# Patient Record
Sex: FEMALE | Race: BLACK OR AFRICAN AMERICAN | Employment: FULL TIME | URBAN - METROPOLITAN AREA
[De-identification: names, ages, dates, MRNs, and addresses within clinical notes are randomized per-mention and may not be internally consistent; named-entity substitution may affect disease eponyms.]

---

## 2017-05-07 ENCOUNTER — APPOINTMENT (OUTPATIENT)
Dept: GENERAL RADIOLOGY | Age: 22
End: 2017-05-07
Attending: EMERGENCY MEDICINE
Payer: MEDICAID

## 2017-05-07 ENCOUNTER — HOSPITAL ENCOUNTER (EMERGENCY)
Age: 22
Discharge: HOME | End: 2017-05-07
Attending: EMERGENCY MEDICINE | Admitting: EMERGENCY MEDICINE
Payer: MEDICAID

## 2017-05-07 VITALS
OXYGEN SATURATION: 98 % | HEART RATE: 79 BPM | WEIGHT: 240 LBS | DIASTOLIC BLOOD PRESSURE: 65 MMHG | BODY MASS INDEX: 39.99 KG/M2 | SYSTOLIC BLOOD PRESSURE: 130 MMHG | TEMPERATURE: 98.3 F | RESPIRATION RATE: 18 BRPM | HEIGHT: 65 IN

## 2017-05-07 DIAGNOSIS — R07.89 MUSCULOSKELETAL CHEST PAIN: Primary | ICD-10-CM

## 2017-05-07 LAB
ATRIAL RATE: 86 BPM
CALCULATED P AXIS, ECG09: 48 DEGREES
CALCULATED R AXIS, ECG10: 68 DEGREES
CALCULATED T AXIS, ECG11: 36 DEGREES
DIAGNOSIS, 93000: NORMAL
P-R INTERVAL, ECG05: 172 MS
Q-T INTERVAL, ECG07: 386 MS
QRS DURATION, ECG06: 86 MS
QTC CALCULATION (BEZET), ECG08: 461 MS
VENTRICULAR RATE, ECG03: 86 BPM

## 2017-05-07 PROCEDURE — 71020 XR CHEST PA LAT: CPT

## 2017-05-07 PROCEDURE — 99283 EMERGENCY DEPT VISIT LOW MDM: CPT

## 2017-05-07 PROCEDURE — 93005 ELECTROCARDIOGRAM TRACING: CPT

## 2017-05-07 RX ORDER — NAPROXEN 375 MG/1
375 TABLET ORAL 2 TIMES DAILY WITH MEALS
Qty: 20 TAB | Refills: 0 | Status: SHIPPED | OUTPATIENT
Start: 2017-05-07

## 2017-05-07 NOTE — LETTER
[facility] [place] 
THE [facility] EMERGENCY DEPT 
[address] [address] 
[phone] Work/School Note Date: 5/7/2017 To Whom It May concern: 
 
Vivek Tafoya was seen and treated today in the emergency room by the following provider(s): 
Attending Provider: Harriett Pardo MD. Vivek Tafoya may return to work on 5/10/2017 Sincerely, 
 
 
 
 
Harriett Pardo MD

## 2017-05-08 NOTE — ED TRIAGE NOTES
Pt reports to ED c/c left side chest pain onset 2 days while lying down.  Pt states, \"it feels like something is stuck in my chest.\"

## 2017-05-08 NOTE — ED NOTES
I have reviewed discharge instructions with the patient. The patient verbalized understanding. Patient armband removed and shredded. Patient given 1 prescriptions. Discussed side effects with patient. patient verbalized understanding. Patient ambulated to lobby with steady gait. Patient discharged in stable condition to care of self.

## 2017-05-08 NOTE — ED NOTES
Patient sitting on stretcher in NAD; patient given warm blanket. Updated on plan of care. Patient given remote.

## 2017-05-08 NOTE — DISCHARGE INSTRUCTIONS
Chest Pain: Care Instructions  Your Care Instructions  There are many things that can cause chest pain. Some are not serious and will get better on their own in a few days. But some kinds of chest pain need more testing and treatment. Your doctor may have recommended a follow-up visit in the next 8 to 12 hours. If you are not getting better, you may need more tests or treatment. Even though your doctor has released you, you still need to watch for any problems. The doctor carefully checked you, but sometimes problems can develop later. If you have new symptoms or if your symptoms do not get better, get medical care right away. If you have worse or different chest pain or pressure that lasts more than 5 minutes or you passed out (lost consciousness), call 911 or seek other emergency help right away. A medical visit is only one step in your treatment. Even if you feel better, you still need to do what your doctor recommends, such as going to all suggested follow-up appointments and taking medicines exactly as directed. This will help you recover and help prevent future problems. How can you care for yourself at home? · Rest until you feel better. · Take your medicine exactly as prescribed. Call your doctor if you think you are having a problem with your medicine. · Do not drive after taking a prescription pain medicine. When should you call for help? Call 911 if:  · You passed out (lost consciousness). · You have severe difficulty breathing. · You have symptoms of a heart attack. These may include:  ¨ Chest pain or pressure, or a strange feeling in your chest.  ¨ Sweating. ¨ Shortness of breath. ¨ Nausea or vomiting. ¨ Pain, pressure, or a strange feeling in your back, neck, jaw, or upper belly or in one or both shoulders or arms. ¨ Lightheadedness or sudden weakness. ¨ A fast or irregular heartbeat.   After you call 911, the  may tell you to chew 1 adult-strength or 2 to 4 low-dose aspirin. Wait for an ambulance. Do not try to drive yourself. Call your doctor today if:  · You have any trouble breathing. · Your chest pain gets worse. · You are dizzy or lightheaded, or you feel like you may faint. · You are not getting better as expected. · You are having new or different chest pain. Where can you learn more? Go to http://genesis-jessica.info/. Enter A120 in the search box to learn more about \"Chest Pain: Care Instructions. \"  Current as of: May 27, 2016  Content Version: 11.2  © 5915-7316 [employer]. Care instructions adapted under license by [employer] (which disclaims liability or warranty for this information). If you have questions about a medical condition or this instruction, always ask your healthcare professional. [address] any warranty or liability for your use of this information. Musculoskeletal Chest Pain: Care Instructions  Your Care Instructions  Chest pain is not always a sign that something is wrong with your heart or that you have another serious problem. The doctor thinks your chest pain is caused by strained muscles or ligaments, inflamed chest cartilage, or another problem in your chest, rather than by your heart. You may need more tests to find the cause of your chest pain. Follow-up care is a key part of your treatment and safety. Be sure to make and go to all appointments, and call your doctor if you are having problems. Its also a good idea to know your test results and keep a list of the medicines you take. How can you care for yourself at home? · Take pain medicines exactly as directed. ¨ If the doctor gave you a prescription medicine for pain, take it as prescribed. ¨ If you are not taking a prescription pain medicine, ask your doctor if you can take an over-the-counter medicine. · Rest and protect the sore area.   · Stop, change, or take a break from any activity that may be causing your pain or soreness. · Put ice or a cold pack on the sore area for 10 to 20 minutes at a time. Try to do this every 1 to 2 hours for the next 3 days (when you are awake) or until the swelling goes down. Put a thin cloth between the ice and your skin. · After 2 or 3 days, apply a heating pad set on low or a warm cloth to the area that hurts. Some doctors suggest that you go back and forth between hot and cold. · Do not wrap or tape your ribs for support. This may cause you to take smaller breaths, which could increase your risk of lung problems. · Mentholated creams such as Bengay or Icy Hot may soothe sore muscles. Follow the instructions on the package. · Follow your doctor's instructions for exercising. · Gentle stretching and massage may help you get better faster. Stretch slowly to the point just before pain begins, and hold the stretch for at least 15 to 30 seconds. Do this 3 or 4 times a day. Stretch just after you have applied heat. · As your pain gets better, slowly return to your normal activities. Any increased pain may be a sign that you need to rest a while longer. When should you call for help? Call 911 anytime you think you may need emergency care. For example, call if:  · You have chest pain or pressure. This may occur with:  ¨ Sweating. ¨ Shortness of breath. ¨ Nausea or vomiting. ¨ Pain that spreads from the chest to the neck, jaw, or one or both shoulders or arms. ¨ Dizziness or lightheadedness. ¨ A fast or uneven pulse. After calling 911, chew 1 adult-strength aspirin. Wait for an ambulance. Do not try to drive yourself. · You have sudden chest pain and shortness of breath, or you cough up blood. Call your doctor now or seek immediate medical care if:  · You have any trouble breathing. · Your chest pain gets worse.   · Your chest pain occurs consistently with exercise and is relieved by rest.  Watch closely for changes in your health, and be sure to contact your doctor if:  · Your chest pain does not get better after 1 week. Where can you learn more? Go to http://genesis-jessica.info/. Enter V293 in the search box to learn more about \"Musculoskeletal Chest Pain: Care Instructions. \"  Current as of: May 27, 2016  Content Version: 11.2  © 0353-2785 [employer]. Care instructions adapted under license by [employer] (which disclaims liability or warranty for this information). If you have questions about a medical condition or this instruction, always ask your healthcare professional. [address] any warranty or liability for your use of this information.

## 2017-05-08 NOTE — ED PROVIDER NOTES
HPI Comments: 9:02 PM   Israel Eldridge is a 24 y.o. female presenting to the ED C/O intermittent left sided chest pain (6/10) onset 2 days ago. Pt reports she has had muscle spasm-like pain in her chest for the past two days that feel like \"shocks\" . Bending down worsens her pain. Pt states that when her chest pain begins she also feels neck pain. Pt also complains of nausea. PMHx of asthma Pt denies use of birth control or recent travel. FMHx cardiac problems (father). Pt denies coughing, SOB,difficulty swallowing, loss of appetite, trouble urinating, constipation, diarrhea and any other Sx or complaints. Patient is a 24 y.o. female presenting with chest pain. The history is provided by the patient. No  was used. Chest Pain (Angina)    This is a new problem. The current episode started 2 days ago. The problem has not changed since onset. The pain is present in the left side. The pain is at a severity of 6/10. Associated symptoms include nausea. Pertinent negatives include no cough and no shortness of breath. Written by ALMA Loweibwanda, as dictated by Missy Hall MD    Past Medical History:   Diagnosis Date    Asthma        History reviewed. No pertinent surgical history. History reviewed. No pertinent family history. Social History     Social History    Marital status: SINGLE     Spouse name: N/A    Number of children: N/A    Years of education: N/A     Occupational History    Not on file. Social History Main Topics    Smoking status: Never Smoker    Smokeless tobacco: Not on file    Alcohol use Yes    Drug use: No    Sexual activity: Not on file     Other Topics Concern    Not on file     Social History Narrative    No narrative on file         ALLERGIES: Review of patient's allergies indicates no known allergies. Review of Systems   Constitutional: Negative for appetite change. HENT: Negative for trouble swallowing.     Respiratory: Negative for cough and shortness of breath. Cardiovascular: Positive for chest pain. Gastrointestinal: Positive for nausea. Negative for constipation and diarrhea. Genitourinary: Negative for difficulty urinating. Musculoskeletal: Positive for neck pain. All other systems reviewed and are negative. Vitals:    05/07/17 2005 05/07/17 2205   BP: 137/82 130/65   Pulse: 77 79   Resp: 16 18   Temp: 98.3 °F (36.8 °C)    SpO2: 100% 98%   Weight: 108.9 kg (240 lb)    Height: 5' 5\" (1.651 m)             Physical Exam   Nursing note and vitals reviewed. -------------------------PHYSICAL EXAM-------------------------    Vital signs and nursing notes reviewed  Nursing note and VS were reviewed      CONSTITUTIONAL: Mental status: Awake and alert. No immediate acute distress. Non-toxic in appearance. Well developed, well nourished and appears adequately hydrated. HEAD: Normocephalic, Atraumatic. EYES: Pupils are equal, round and reactive. Extra-ocular movements intact. Sclera are non icteric. Conjunctiva not injected. ENT: Mucous membranes are moist.   Oral mucosa: There is no erythema or swelling; No oral lesions or thrush. Tonsillar tissue: NO enlargement of the tonsillar tissue or the presence of exudates. Ears: R:  EAC - clear, no swelling with TM that is normal in appearance. L:  EAC - clear, no swelling with TM that is normal in appearance. Nasal mucosa pink with no discharge and the turbinates are of normal size. NECK: Normal ROM. Neck is supple. Anterior aspect: Anterior cervical adenopathy: nothing abnormal.  No obvious enlargement of the thyroid. Trachea is midline. Posterior aspect: Posterior cervical paraspinal muscles are non tender and  bony midline is non tender. Posterior adenopathy: none palpable. CARDIOVASCULAR:  The rhythm is regular and the rate sounds to be normal. No murmurs, rubs or gallops.  Distal pulses are 2+ and equal.    PULMONARY: Respiratory effort is normal and without the use of accessory muscles. Patient is speaking in full sentences. Air exchange is good. Breath sounds:  Clear to auscultation bilaterally. No wheezing, rales or rhonchi. CHEST WALL: Normal shape; Chest wall with mild tenderness extending to left pectoralis muscle    ABDOMINAL: Visual: non -distended; Ausculation: Bowel sounds are present. Palpation: Soft; No obvious organomegaly; Palpable tenderness: NONE. NO peritoneal signs - No rebound, guarding or rigidity. BACK: Midline - No bony tenderness; Paraspinal muscles are non tender. Full range of motion. No CVA tenderness. MUSCULOSKELETAL:   Lower Extremities: Peripheral edema- none noted; In general there is No obvious soft tissue tenderness or sites of bony tenderness or deformities;   Joints: No evidence of acute inflammatory changes and have good range of motion in all extremities. Muscles: Good tone and No obvious muscle tenderness. Upper Extremities: Peripheral edema- none noted; In general there is No obvious soft tissue tenderness or sites of bony tenderness or deformities;   Joints: No evidence of acute inflammatory changes and have good range of motion in all extremities. Muscles: Good tone and No obvious muscle tenderness. SKIN:  Good skin turgor. Cap Refill is Normal. Skin is warm and dry and with NO diaphoresis. Rashes: NONE or nothing that appears infectious; NO petechiae. NO particular lesions. NEUROLOGICAL: Alert, awake and appropriately oriented. Normal speech. CN's are normal;  Motor - no focal weakness; no obvious sensory loss; Cerebellar function- intact; DTR's - 2+ equal    PSYCH: Appropriate affect; normal thought content; no expressed suicidal ideation.       MDM  Number of Diagnoses or Management Options  Musculoskeletal chest pain:   Diagnosis management comments: INITIAL CLINICAL IMPRESSION and PLANS:  The patient presents with the primary complaint(s) of: chest pain. The presentation, to include historical aspects and clinical findings appear to be consistent with the DX of musculoskeletal pain. However, other possible DX's to consider as primary, associated with, or exacerbated by include:    pulmonary process    Considering the above, my initial management plan to evaluate and therapeutic interventions include: Obtain Radiologic studies,      Hans Sinclair MD         Amount and/or Complexity of Data Reviewed  Tests in the radiology section of CPT®: reviewed and ordered (Chest x-ray)  Tests in the medicine section of CPT®: reviewed and ordered (EKG)  Independent visualization of images, tracings, or specimens: yes (Chest x-ray, EKG)      ED Course     Medications - No data to display   Procedures    EKG interpretation: (Preliminary)  Normal Sinus rhythm, 86 bpm, QT//461 ms  EKG read by Nikolas Mills PA-C MD  at 7:34 PM    ED CLINICAL SUMMARY - DISCHARGE     11:18 PM   CLINICAL COURSE while in the ED:      Intervention)s) while in ED:  ACTIONS / APPROACH: Based on the presenting ACUTE history of chest pain My initial focus was to Determine the cause and extent of the problem and Initiate Treatment as Appropriate . Details of actions taken are noted below. SPECIFICS REGARDING APPROACH:     1.  DIAGNOSTIC RESULTS:     X-RAY FINDINGS:  11:09 PM  Chest x-ray shows no acute process  Pending review by Radiologist  Recorded by Patricia Farooq, ED Scribe, as dictated by Jerri Lr MD     XR CHEST PA LAT    (Results Pending)          Labs Reviewed - No data to display        Recent Results (from the past 12 hour(s))   EKG, 12 LEAD, INITIAL    Collection Time: 05/07/17  7:34 PM   Result Value Ref Range    Ventricular Rate 86 BPM    Atrial Rate 86 BPM    P-R Interval 172 ms    QRS Duration 86 ms    Q-T Interval 386 ms    QTC Calculation (Bezet) 461 ms    Calculated P Axis 48 degrees    Calculated R Axis 68 degrees    Calculated T Axis 36 degrees    Diagnosis Normal sinus rhythm  Poor R Wave Progression  Clinical correlation needed  Confirmed by Allen Winkler MD, Sharath ([id number]) on 5/7/2017 9:29:02 PM         2. MEDICATIONS GIVEN: Medications - No data to display     Response to Intervention(s):   IMPROVED       Unanticipated Developments: NONE     ED COURSE - General Comment:  During the ED course I had re-evaluated the patient, answered their and /or their family's questions regarding my clinical impression, the patient's condition and plans for therapeutic interventions. The patient's ED course was uneventful and remained stable throughout. CLINICAL IMPRESSION AND DISCUSSION:   I reviewed our electronic medical record system for any past medical records that were available that may contribute to the patients current condition, the nursing notes and vital signs from today's visit. Based on the clinical presentation, findings and results of diagnostic studies, as well as developments while in the ED,  I suspect the following: For the presentation noted above    My clinical impression is: musculoskeletal chest pain        DISCUSSION REGARDING CLINICAL IMPRESSION: The specifics regarding my clinical impression / diagnosis are as follows: At this time there is no clinical evidence to support other pertinent diagnostic considerations such as:   N/A    SUMMARY DISCUSSION:        Specific Conversations:  NONE      DISPOSITION DECISION:     DISCHARGE: I feel that we have optimized outpatient assessment and management such that Zulma Rhodes is stable to be discharged and to continue with her care or complete any additional evaluation as appropriate at home or as an outpatient. Preparations will be made to discharge the patient. Present condition at the time of disposition: STABLE    ANY SPECIFIC INFORMATION REGARDING THE DISPOSITION: NONE        DISCHARGE NOTE:    Zachary Mejia  results have been reviewed with her.   She has been counseled regarding her diagnosis, treatment, and plan. She verbally conveys understanding and agreement of the signs, symptoms, diagnosis, treatment and prognosis and additionally agrees to follow up as discussed. She also agrees with the care-plan and conveys that all of her questions have been answered. I have also provided discharge instructions for her that include: educational information regarding their diagnosis and treatment, and list of reasons why they would want to return to the ED prior to their follow-up appointment, should her condition change. The patient and/or family has been provided with education for proper Emergency Department utilization. CLINICAL IMPRESSION  1. Musculoskeletal chest pain        PLAN:  1. D/C home  2. Patient's Medications   Start Taking    NAPROXEN (NAPROSYN) 375 MG TABLET    Take 1 Tab by mouth two (2) times daily (with meals). Continue Taking    No medications on file   These Medications have changed    No medications on file   Stop Taking    No medications on file     3. Follow-up Information     Follow up With Details Comments [facility], [place] Schedule an appointment as soon as possible for a visit in 2 days Follow up with your primary care physician [address] [facility]      THE [facility] EMERGENCY DEPT Go to If symptoms worsen, As needed [address] [place] [id number]  [phone]        Return if sxs worsen    ATTESTATIONS:  This note is prepared by Alec Triana, acting as Scribe for Merlene Walker MD.    Merlene Walker MD: The scribe's documentation has been prepared under my direction and personally reviewed by me in its entirety. I confirm that the note above accurately reflects all work, treatment, procedures, and medical decision making performed by me.

## 2022-11-08 ENCOUNTER — OFFICE VISIT (OUTPATIENT)
Dept: SURGERY | Age: 27
End: 2022-11-08
Payer: MEDICAID

## 2022-11-08 ENCOUNTER — HOSPITAL ENCOUNTER (OUTPATIENT)
Dept: BARIATRICS/WEIGHT MGMT | Age: 27
Discharge: HOME | End: 2022-11-08

## 2022-11-08 VITALS — HEIGHT: 66 IN | BODY MASS INDEX: 46.12 KG/M2 | WEIGHT: 287 LBS

## 2022-11-08 VITALS — HEIGHT: 66 IN | BODY MASS INDEX: 46.12 KG/M2 | WEIGHT: 287 LBS | RESPIRATION RATE: 18 BRPM

## 2022-11-08 DIAGNOSIS — F41.9 ANXIETY: Chronic | ICD-10-CM

## 2022-11-08 DIAGNOSIS — E66.01 CLASS 3 SEVERE OBESITY WITH SERIOUS COMORBIDITY IN ADULT, UNSPECIFIED BMI, UNSPECIFIED OBESITY TYPE (HCC): ICD-10-CM

## 2022-11-08 DIAGNOSIS — I10 HYPERTENSION, UNSPECIFIED TYPE: Primary | ICD-10-CM

## 2022-11-08 PROCEDURE — 99205 OFFICE O/P NEW HI 60 MIN: CPT | Performed by: STUDENT IN AN ORGANIZED HEALTH CARE EDUCATION/TRAINING PROGRAM

## 2022-11-08 RX ORDER — AMLODIPINE BESYLATE 5 MG/1
5 TABLET ORAL DAILY
COMMUNITY

## 2022-11-08 NOTE — PROGRESS NOTES
Bariatric Surgery Initial Consult    Patient: Dwight Coley MRN: [id number]  SSN: [id number]    YOB: 1995  Age: 28 y.o. Sex: female      Subjective:      CC: Morbid Obesity    Current Weight: 287  Body mass index is 46.32 kg/m². Ideal body weight: 59.3 kg (130 lb 11.7 oz)  Adjusted ideal body weight: 87.7 kg (193 lb 3.8 oz)  Excess Body Weight: 150    Dwight Coley is a 28 y.o. female who is being seen for new bariatric consultation. She has been thinking about bariatric surgery for several years. She is currently at her highest weight. She has tried weight loss in the past by increasing exercise as well as with a low-carb diet and weight watchers without any lasting sustained success. She has struggled with her weight for a long period of time, though she did gain more weight after her pregnancy in 2020. Obesity related comorbidities include hypertension. She does have only mild asthma which is only typically an issue when she gets sick. Does have a history of anxiety and some depression and was on Zoloft for a time, though she weaned herself off of it because she did not like the way it made her feel and was contributing to her weight gain. She denies any history of heartburn, regurgitation, or other reflux symptoms. She does note that she was told during her last pregnancy that she had a murmur and had seen a cardiologist in [place] regarding this. Those records are not available to us. She is interested in the sleeve gastrectomy. Past Medical History:   Diagnosis Date    Asthma    - Hypertension    PSH: D&C    No Known Allergies  Current Outpatient Medications   Medication Sig Dispense Refill    naproxen (NAPROSYN) 375 mg tablet Take 1 Tab by mouth two (2) times daily (with meals).  20 Tab 0   - amlodipine 5 mg every day    Social History     Tobacco Use    Smoking status: Never    Smokeless tobacco: Not on file   Substance Use Topics    Alcohol use: Yes   - History of THC use    FH: AAA     Last Pap Smear: 2021  Mammogram: N/A  Colonoscopy: N/A  EGD/UGI:None  STOP-BANG score: 2     Review of Systems:    General: Denies fevers, chills, night sweats, fatigue, weight loss, or weight gain. HEENT: Denies changes in auditory or visual acuity, recurrent pharyngitis, epistaxis, chronic rhinorrhea, vertigo    Respiratory: Denies increasing shortness of breath, productive cough, hemoptysis    Cardiac: Denies known history of cardiac disease, heart murmur, palpitations    GI: Denies dysphagia, recurrent emesis, hematemesis, changes in bowel habits, hematochezia, melena    : Denies hematuria frequency urgency dysuria    Musculoskeletal: Denies fractures, dislocations    Neurologic: Denies history of CVA, paralysis paresthesias, recurrent cephalgia, seizures    Endocrine: Denies polyuria, polydipsia, polyphagia, heat and cold intolerance    Lymph/heme: Denies a history of malignancy, anemia, bruising, blood transfusions    Integumentary: Negative for dermatitis     Psych: + history of depression or anxiety, has been on Zoloft and seen psychologist in the past    Objective:     Vitals:    11/08/22 0900   Resp: 18   Weight: 130.2 kg (287 lb)   Height: 5' 6\" (1.676 m)        General: no acute distress, nontoxic in appearance. Head: Normocephalic, atraumatic  Mouth: Clear, no overt lesions, oral mucosa is pink and moist.  Neck: Supple, no masses, trachea midline  Resp: Clear to auscultation bilaterally, no wheezing. Excursions normal and symmetrical.  Cardio: Regular rate and rhythm, no murmurs appreciated  Abdomen: soft, nontender, nondistended, no hernias. Extremities: Warm, well perfused, no tenderness or swelling, normal gait/station, without edema or varicosities  Neuro: Sensation and strength grossly intact and symmetrical.  Psych: Alert and oriented to person, place, and time. Labs:     N/A      Assessment:      This patient is a 28 y.o. obese female with a current Body mass index is 46.32 kg/m². who is considering bariatric surgery, and would be an appropriate candidate for enrollment in the preoperative pathway. The patient is considering Laparoscopic Sleeve Gastrectomy for surgical weight loss due to their ineffective progress with medical forms of weight loss and the urging of their physicians who care for their primary medical issues. The patient  now presents  for consideration for weight loss surgery understanding the benefits of this over a medical approach of weight loss as was discussed in our seminar on weight loss surgery. They have discussed their plans both with their family and primary care physician who is in support of their pursuit of such. The patient's goal weight is 180 lb. These goals are consistent with expected outcomes of their desired operation. Her Medical goals are resolution of these health issues. The possible short and long term  complications of the sleeve gastrectomy were also discussed, to include but not limited to;death, DVT/PE, staple line leak, bleeding, stricture formation, surgical site infection, and reflux. I spent a significant amount of time emphasizing the reflux risk from this procedure which ranges from 25-40% postoperatively. Specific weight related outcomes for success were also discussed with an emphasis on careful and close follow-up with the first year and dietary behavior modification over the first years as baseline cyclical hunger returns  The patient expressed an understanding of the above factors, and her questions were answered in their entirety. Plan: Will plan to enroll in the preoperative bariatric pathway  Will schedule consultation with our dietitian  Patient referred to outpatient psych provider for preoperative clearance. Standard preoperative bariatric lab panel ordered.   H. pylori breath test completed in the clinic today  Upper GI ordered given her preference for sleeve gastrectomy  I have asked her to bring records from her outpatient cardiology appointment to her next visit.   She is not on any specific therapy for her history of murmur, though I do not appreciate one on physical exam today  Return to clinic for midpoint evaluation    Signed By: Fidelia Mao MD     November 8, 2022

## 2022-11-08 NOTE — PROGRESS NOTES
Medical Weight Loss Multi-Disciplinary Program    Patient's Name: Farnaz Leo Age: 28 y.o. YOB: 1995 Sex: female     Session #1. Pt attended in-person class. Weight obtained in office. Date: 11/8/2022     Visit Vitals  Ht 5' 6\" (1.676 m)   Wt 130.2 kg (287 lb)   BMI 46.32 kg/m²       Pounds Lost since last month: N/A Pounds Gained since last month: N/A       Starting Weight (Initial Consult/Seminar, 11/8/2022): 287 lbs Previous Months Weight: N/A   Overall Pounds Lost: 0.0 lbs  Overall Pounds Gained: 0.0 lbs     Do you smoke? no    Alcohol intake:  Number of drinks per week:  \"2 maybe less\"    Class Guidelines    Guidelines are reviewed with patient at the start of every class. 1. Patient understands that weight loss trial classes must be consecutive. Patient understands if they miss a class, it is their responsibility to contact me to reschedule class. I will reach out to patient after their first no show. 2.  Patient understands the expectations that weight maintenance/weight loss is expected during the classes. Failure to demonstrate changes may result in extension of weight loss trial, followed by returning to see the surgeon. Patient understands that they CANNOT gain any weight during the weight loss trial.  Gaining weight will result in extension of weight loss trial.  3. Patient is also instructed to complete their labwork, psychological evaluation visit, and any other tests that the surgeon has ordered while they are working on their weight loss trial.  4.  Patient was instructed to bring their packet of nutrition education materials to every class and appointment. Eating Habits and Behaviors    Reviewed intake  Understanding low carbohydrates, low sugar, higher protein meals  Instruction given for personal dietary changes  Discussed perceived compliance    Comments: RD Reviewed Diet History and Physical Activity/Exercise habits.   Recommended dietary changes discussed for both before and after surgery. Today in class we reviewed the Key Diet Principles. Patient was encouraged to consume 3 meals each day, and meal timing was reviewed. Meal time behaviors that will help pt to be successful with their weight loss efforts were also discussed. We discussed the importance of drinking adequate amounts of fluids, recommending that patient consume a minimum of 64 oz of sugar-free, caffeine-free and carbonation-free fluids each day. Patient was encouraged to eliminate sugar-sweetened beverages such as sweet tea, fruit juice, fruit smoothies, flavored coffee drinks and regular sodas. During the weight loss trial, patient is encouraged to focus on eating protein-forward meals, with a daily goal of  grams protein. Patient was also advised to decrease carbohydrate intake to <100 g/d, choosing complex vs. simple carbohydrates in limited amounts. We also discussed limiting fat intake. Encouraged patient to follow the \"3-gram rule\" when choosing foods by looking for items containing <3 g of fat and sugar per serving. We reviewed meal planning guidelines and discussed appropriate meal and snack options. We also talked about appropriate protein drinks and patient was encouraged to start trying these, using them either for a meal replacement or a protein-rich snack pre-operatively. We reviewed the nutritional guidelines for selecting protein shakes. Pre-operative vitamin and mineral supplementation was reviewed. Patient was instructed to choose a chewable complete multivitamin with iron in NON-gummy form. Selection of probiotic was also reviewed. Comments: During today's class we talked about the role of carbohydrates. Patient was instructed on: The function of carbohydrates. Foods that are carbohydrate-heavy. Patient was given the guidelines to keep their carbohydrates less than 50-75 grams per day in the pre-op phase.   Patient was also given ideas of low carb swaps, which include zucchini noodles, spaghetti squash, or cauliflower rice. Discussed lower carb swaps to use instead of potato chips. Patient's current diet habits include:  Patient is eating 3 meals and 3 snacks per day. Per dietary recall, pts diet has the following concerns:  Pt is consuming 2 alcoholic drinks per week. Pt is only consuming 16 oz water/day. Pt is consuming sugar sweetened foods 2-3 d/wk. Pt is consuming foods high in carbohydrates, such as: grits, cinnamon rolls, Doritos, nachos, \"sweets\", fruit, crackers, pasta and ice cream  Pt is consuming caffeinated beverages in the form of: tea. Pt has found 0 protein supplement shakes for use post-op in meeting their protein needs. Patient's plan for dietary and/or behavior changes in the upcoming month: none noted. Physical Activity/Exercise    Comments: We talked about exercise. Patient was given reasons of why exercise is so important and how that can help with their long-term success. I have encouraged patient to get a support system to help with the activity. We talked about recommended types of physical activity, including walking, swimming, cycling, or chair exercises. I also talked with patient about doing some strength training, which helps the metabolism, as well as strengthen muscles and bones. Patient's plan to incorporate more activity includes: \"Exercise 3 x/wk\". Behavior Modification     Comments:  During today's class, I discussed vitamin and mineral supplementation essential for health and prevention of malnutrition in depth. Patient was directed to the handouts on vitamins and minerals found on pages 27-33 that were provided in class handouts packet as well as a handout titled, \"Nutrient Deficiency and it's symptoms\".   I reviewed the vitamins and minerals that need to be supplemented, dosage recommendations, functions of supplements and signs of deficiencies, as well as examples of specific supplements. Reviewed briefly the requirement  differences between laparoscopic gastric bypass, laparoscopic sleeve gastrectomy, and lap-band procedures, while encouraging all patients to continue supplements for life no matter what bariatric surgery they are preparing for. Goals: Work to increase to 3-4 small meals per day, with 1-2 planned snacks as needed. Recommend following the plate method for meal planning - focusing on lean protein, non-starchy vegetables, and measured amounts of complex carbohydrates. - Goal of  g protein and <100 g carbohydrates per day. - Select at least 2 DIFFERENT protein supplements that can be used for protein supplementation to meet goals pre- and post-operatively. - Practice Carbohydrate Counting and label reading.   - Follow 3 g rule for fat and sugar. 2. Slow down meals  - Chew each bite 25-35 times. - Aim for 20-30 min/meal.  3. Increase non-caloric fluids to 64 oz per day. Eliminate caffeine, added sugar, carbonation, and straws.               - Continue to work to decrease sugar sweetened beverages - goal of calorie-free beverages only. - Must eliminate caffeine prior to surgery and avoid for ~6-8 weeks. - Practice 30:30 rule,  food and fluid intake. 4. Start activity regimen, work to increase ADLs. 5. Start Complete MVI and probiotic at least 30 days pre-op. Candidate for surgery (per RD): PENDING, Pt scheduled for nutrition education on 12/6/2022.

## 2022-11-09 ENCOUNTER — HOSPITAL ENCOUNTER (OUTPATIENT)
Dept: LAB | Age: 27
Discharge: HOME | End: 2022-11-09

## 2022-11-09 DIAGNOSIS — E66.01 CLASS 3 SEVERE OBESITY WITH SERIOUS COMORBIDITY IN ADULT, UNSPECIFIED BMI, UNSPECIFIED OBESITY TYPE (HCC): ICD-10-CM

## 2022-11-09 LAB — XX-LABCORP SPECIMEN COL,LCBCF: NORMAL

## 2022-11-09 PROCEDURE — 99001 SPECIMEN HANDLING PT-LAB: CPT

## 2022-11-11 LAB — UREA BREATH TEST QL: NEGATIVE

## 2023-03-23 ENCOUNTER — HOSPITAL ENCOUNTER (OUTPATIENT)
Facility: HOSPITAL | Age: 28
Discharge: HOME | End: 2023-03-26

## 2023-03-23 VITALS — BODY MASS INDEX: 44.26 KG/M2 | WEIGHT: 275.4 LBS | HEIGHT: 66 IN

## 2023-03-23 NOTE — PROGRESS NOTES
Medical Weight Loss Multi-Disciplinary Program      Patient's Name: Juhi Reid Age: 28 y.o. YOB: 1995 Sex: female     Session #1 (RESTART). Pt attended in-person class. Weight obtained in office. Date: 3/23/2023    Vitals:    03/23/23 1100   Weight: 275 lb 6.4 oz (124.9 kg)   Height: 5' 6\" (1.676 m)      Body mass index is 44.45 kg/m². Pounds Lost since last month: N/A Pounds Gained since last month: N/A       Starting Weight: 287 lbs Previous Months Weight: N/A   Overall Pounds Lost: 11.6 lbs  Overall Pounds Gained: 0 lbs      Do you smoke? no    Alcohol intake:  Number of drinks  per week: 0    Class Guidelines    Guidelines are reviewed with patient at the start of every class. 1. Patient understands that weight loss trial classes must be consecutive. Patient understands if they miss a class, it is their responsibility to contact me to reschedule class. I will reach out to patient after their first no show. 2.  Patient understands the expectations that weight maintenance/weight loss is expected during the classes. Failure to demonstrate changes may result in one extra month of weight loss trial, followed by going back to see the surgeon. Patient understands that they CAN NOT gain any weight during the weight loss trial.  Gaining weight will result in extra classes. 3. Patient is also instructed to be doing their labs, blood work, psych visit, support group and any other test that the surgeon has used while they are working on their weight loss trial.  4.  Patient was instructed to bring their blue folder to every class and appointment. Eating Habits and Behaviors    Today in class, we reviewed the key diet principles. Pt was encouraged to consume 3 meals each day; the timing the meals was reviewed. Meal time behaviors that will help pt to be successful with their weight loss efforts were additionally reviewed.      RD discussed the importance of adequate fluids,